# Patient Record
Sex: FEMALE | Race: AMERICAN INDIAN OR ALASKA NATIVE
[De-identification: names, ages, dates, MRNs, and addresses within clinical notes are randomized per-mention and may not be internally consistent; named-entity substitution may affect disease eponyms.]

---

## 2018-08-29 ENCOUNTER — HOSPITAL ENCOUNTER (EMERGENCY)
Dept: HOSPITAL 5 - ED | Age: 43
LOS: 1 days | Discharge: HOME | End: 2018-08-30
Payer: SELF-PAY

## 2018-08-29 DIAGNOSIS — R10.84: Primary | ICD-10-CM

## 2018-08-29 PROCEDURE — 99284 EMERGENCY DEPT VISIT MOD MDM: CPT

## 2018-08-29 PROCEDURE — 84703 CHORIONIC GONADOTROPIN ASSAY: CPT

## 2018-08-29 PROCEDURE — 96375 TX/PRO/DX INJ NEW DRUG ADDON: CPT

## 2018-08-29 PROCEDURE — 82150 ASSAY OF AMYLASE: CPT

## 2018-08-29 PROCEDURE — 74177 CT ABD & PELVIS W/CONTRAST: CPT

## 2018-08-29 PROCEDURE — 83690 ASSAY OF LIPASE: CPT

## 2018-08-29 PROCEDURE — 96374 THER/PROPH/DIAG INJ IV PUSH: CPT

## 2018-08-29 PROCEDURE — 85025 COMPLETE CBC W/AUTO DIFF WBC: CPT

## 2018-08-29 PROCEDURE — 36415 COLL VENOUS BLD VENIPUNCTURE: CPT

## 2018-08-29 PROCEDURE — 80053 COMPREHEN METABOLIC PANEL: CPT

## 2018-08-30 VITALS — DIASTOLIC BLOOD PRESSURE: 82 MMHG | SYSTOLIC BLOOD PRESSURE: 160 MMHG

## 2018-08-30 LAB
ALBUMIN SERPL-MCNC: 3.6 G/DL (ref 3.9–5)
ALT SERPL-CCNC: 9 UNITS/L (ref 7–56)
BASOPHILS # (AUTO): 0 K/MM3 (ref 0–0.1)
BASOPHILS NFR BLD AUTO: 0.3 % (ref 0–1.8)
BUN SERPL-MCNC: 12 MG/DL (ref 7–17)
BUN/CREAT SERPL: 20 %
CALCIUM SERPL-MCNC: 9.4 MG/DL (ref 8.4–10.2)
EOSINOPHIL # BLD AUTO: 0.2 K/MM3 (ref 0–0.4)
EOSINOPHIL NFR BLD AUTO: 1.6 % (ref 0–4.3)
HCT VFR BLD CALC: 28.1 % (ref 30.3–42.9)
HEMOLYSIS INDEX: 0
HGB BLD-MCNC: 8.6 GM/DL (ref 10.1–14.3)
LIPASE SERPL-CCNC: 15 UNITS/L (ref 13–60)
LYMPHOCYTES # BLD AUTO: 2.8 K/MM3 (ref 1.2–5.4)
LYMPHOCYTES NFR BLD AUTO: 27.8 % (ref 13.4–35)
MCH RBC QN AUTO: 20 PG (ref 28–32)
MCHC RBC AUTO-ENTMCNC: 31 % (ref 30–34)
MCV RBC AUTO: 64 FL (ref 79–97)
MONOCYTES # (AUTO): 0.5 K/MM3 (ref 0–0.8)
MONOCYTES % (AUTO): 5 % (ref 0–7.3)
PLATELET # BLD: 528 K/MM3 (ref 140–440)
RBC # BLD AUTO: 4.42 M/MM3 (ref 3.65–5.03)

## 2018-08-30 NOTE — EMERGENCY DEPARTMENT REPORT
HPI





- General


Chief Complaint: Abdominal Pain


Time Seen by Provider: 18 00:44





- HPI


HPI: 





Room 8





The patient is a 43-year-old female presenting with a chief complaint of 

abdominal pain.  The patient states she is constant left flank pain since May 

2018.  The patient states she went to a clinic 2018 labs performed.  The 

patient was given a referral for outpatient CT and GI follow-up however the 

patient has been unable to do so secondary to lack of insurance.  The patient 

states her pain has become diffuse over the past 4-5 days but still greatest on 

the left side.  Patient denies nausea vomiting or diarrhea, dysuria, hematuria 

either.  Patient describes pain as a pressure in nature.  The patient gives her 

pain a score of 7/10.





Location: [See above]


Duration: [See above]


Quality: Pressure


Severity: 7/10


Modifying factors: [see above]


Context: [see above]


Mode of transportation: [not driving]





ED Past Medical Hx





- Past Medical History


Previous Medical History?: Yes


Additional medical history: Anemia





- Surgical History


Past Surgical History?: No





- Family History


Family history: no significant





- Social History


Smoking Status: Never Smoker


Substance Use Type: None (denies illicit drug use)





- Medications


Home Medications: 


 Home Medications











 Medication  Instructions  Recorded  Confirmed  Last Taken  Type


 


traMADol [Ultram] 50 mg PO Q6HR PRN #20 tablet 18  Unknown Rx














ED Review of Systems


ROS: 


Stated complaint: STOMACH PAIN


Other details as noted in HPI





Constitutional: denies: fever


Eyes: denies: eye pain


ENT: denies: throat pain


Respiratory: no symptoms reported


Cardiovascular: denies: chest pain


Endocrine: no symptoms reported


Gastrointestinal: abdominal pain.  denies: nausea, vomiting, diarrhea


Genitourinary: denies: dysuria, hematuria


Musculoskeletal: denies: back pain


Neurological: denies: headache





Physical Exam





- Physical Exam


Vital Signs: 


 Vital Signs











  18





  20:38 00:27


 


Temperature 99 F 97.6 F


 


Pulse Rate 90 85


 


Respiratory 16 14





Rate  


 


Blood Pressure 167/68 207/65


 


O2 Sat by Pulse 97 100





Oximetry  











Physical Exam: 





GENERAL: The patient is well-developed well-nourished female lying on stretcher 

not appearing to be in acute distress. []


HEENT: Normocephalic.  Atraumatic.  Extraocular motions are intact.  Patient 

has moist mucous membranes.


NECK: Supple.  Trachea midline


CHEST/LUNGS: Clear to auscultation.  There is no respiratory distress noted.


HEART/CARDIOVASCULAR: Regular.  There is no tachycardia.  There is no gallop 

rub or murmur.


ABDOMEN: Abdomen is soft, diffusely tender to palpation but greatest in the 

epigastric region.  There is no rebound or guarding.  Patient has normal bowel 

sounds.  There is no abdominal distention.


SKIN: There is no rash.  There is no edema.  There is no diaphoresis.


NEURO: The patient is awake, alert, and oriented.  The patient is cooperative.  

The patient has normal speech


MUSCULOSKELETAL: There is no CVA tenderness.  There is no evidence of acute 

injury.





ED Course


 Vital Signs











  18





  20:38 00:27


 


Temperature 99 F 97.6 F


 


Pulse Rate 90 85


 


Respiratory 16 14





Rate  


 


Blood Pressure 167/68 207/65


 


O2 Sat by Pulse 97 100





Oximetry  














ED Medical Decision Making





- Lab Data


Result diagrams: 


 18 00:58





 18 00:58





 Laboratory Tests











  18





  00:58 00:58 00:58


 


WBC  10.2  


 


RBC  4.42  


 


Hgb  8.6 L  


 


Hct  28.1 L  


 


MCV  64 L  


 


MCH  20 L  


 


MCHC  31  


 


RDW  23.4 H  


 


Plt Count  528 H  


 


Lymph % (Auto)  27.8  


 


Mono % (Auto)  5.0  


 


Eos % (Auto)  1.6  


 


Baso % (Auto)  0.3  


 


Lymph #  2.8  


 


Mono #  0.5  


 


Eos #  0.2  


 


Baso #  0.0  


 


Seg Neutrophils %  65.3  


 


Seg Neutrophils #  6.7  


 


Sodium   138 


 


Potassium   4.0 


 


Chloride   100.1 


 


Carbon Dioxide   26 


 


Anion Gap   16 


 


BUN   12 


 


Creatinine   0.6 L 


 


Estimated GFR   > 60 


 


BUN/Creatinine Ratio   20 


 


Glucose   103 H 


 


Calcium   9.4 


 


Total Bilirubin   0.20 


 


AST   7 


 


ALT   9 


 


Alkaline Phosphatase   105 


 


Total Protein   7.0 


 


Albumin   3.6 L 


 


Albumin/Globulin Ratio   1.1 


 


Amylase   28 


 


Lipase   15 


 


HCG, Qual    Negative














- Radiology Data


Radiology results: report reviewed (CT abdomen and pelvis), image reviewed (CT 

abdomen and pelvis)





[facility] 


[address] [place] [address] 





Cat Scan Report 


Signed 





Patient: GIANNA CAPELLAN MR#: [id number] 


: 1975 Acct:[id number] 


Age/Sex: 43 / F ADM Date: 18 


Loc: ED 


Attending Dr: 








Ordering Physician: KATHYA CELIS MD 


Date of Service: 18 


Procedure(s): CT abdomen pelvis w con 


Accession Number(s): [id number] 





cc: KATHYA CELIS MD 








FINAL REPORT 





EXAM: CT ABDOMEN PELVIS W CON 





HISTORY: diffuse abdominal pain greatest in the epigastric 





COMPARISON: None available. 





TECHNIQUE: Contiguous axial images were obtained. Additional 


sagittal and coronal reformatted images were obtained. 


Administration of IV contrast given per institution protocol. 


Images submitted for interpretation. 100 cc Omnipaque 350. 





FINDINGS: 


Mild linear atelectasis at the right lung base. No calcified 


gallstones or biliary dilatation. Homogeneous enhancement of the 


liver, spleen, pancreas and adrenal glands. No peripancreatic 


stranding or fluid. 





No solid renal lesion. No hydronephrosis. Aorta and IVC normal in 


caliber. Urinary bladder is unremarkable. IUD is low lying along 


endocervical canal and lower uterine segment region of the 


uterus. Ovaries are grossly unremarkable. No free fluid or 


lymphadenopathy. 





Stomach is partially decompressed. No adjacent fat stranding or 


fluid. No focal asymmetric wall thickening of the stomach. 





The appendix is normal in caliber measuring 5 millimeters in 


diameter. Large and small bowel loops normal in caliber. No focal 


inflammatory changes the bowel. Tiny umbilical hernia containing 


fat only. 





Mild degenerative changes of the lumbar spine. Bony pelvis is 


grossly intact. 





IMPRESSION: 


No focal inflammatory changes of the abdomen and pelvis. 





IUD is low lying centered along the endocervical canal and lower 


uterus segment region of the uterus. Uterus and ovaries are 


otherwise unremarkable. 











Transcribed By: LMA 


Dictated By: SHARDA WAGONER MD 


Electronically Authenticated By: SHARDA WAGONER MD 


Signed Date/Time: 18 











DD/DT: 18 


TD/TT: 18





- Differential Diagnosis


gastritis, Crohn's disease, renal colic, diverticulitis


Critical care attestation.: 


If time is entered above; I have spent that time in minutes in the direct care 

of this critically ill patient, excluding procedure time.








ED Disposition


Clinical Impression: 


 Abdominal pain





Disposition: DC-01 TO HOME OR SELFCARE


Is pt being admited?: No


Does the pt Need Aspirin: No


Condition: Stable


Instructions:  Abdominal Pain (ED)


Additional Instructions: 


Return to the emergency department immediately should you develop worsening 

symptoms, fever, inability to tolerate food or liquid or any other concerns.


Prescriptions: 


traMADol [Ultram] 50 mg PO Q6HR PRN #20 tablet


 PRN Reason: Pain


Referrals: 


PRIMARY CARE,MD [Primary Care Provider] - 3-5 Days


[facility] [Outside] - 3-5 Days


ROSEANNE WANG MD [Staff Physician] - 3-5 Days (Dr. Wang is a 

gastroenterologist.  Please follow with him for further evaluation)


Time of Disposition: 02:53

## 2018-08-30 NOTE — CAT SCAN REPORT
FINAL REPORT



EXAM:  CT ABDOMEN PELVIS W CON



HISTORY:  diffuse abdominal pain greatest in the epigastric 



COMPARISON:  None available. 



TECHNIQUE:  Contiguous axial images were obtained. Additional

sagittal and coronal reformatted images were obtained.

Administration of IV contrast given per institution protocol.

Images submitted for interpretation. 100 cc Omnipaque 350. 



FINDINGS:  

Mild linear atelectasis at the right lung base. No calcified

gallstones or biliary dilatation. Homogeneous enhancement of the

liver, spleen, pancreas and adrenal glands. No peripancreatic

stranding or fluid. 



No solid renal lesion. No hydronephrosis. Aorta and IVC normal in

caliber. Urinary bladder is unremarkable. IUD is low lying along

endocervical canal and lower uterine segment region of the

uterus. Ovaries are grossly unremarkable. No free fluid or

lymphadenopathy. 



Stomach is partially decompressed. No adjacent fat stranding or

fluid. No focal asymmetric wall thickening of the stomach. 



The appendix is normal in caliber measuring 5 millimeters in

diameter. Large and small bowel loops normal in caliber. No focal

inflammatory changes the bowel. Tiny umbilical hernia containing

fat only. 



Mild degenerative changes of the lumbar spine. Bony pelvis is

grossly intact. 



IMPRESSION:  

No focal inflammatory changes of the abdomen and pelvis. 



IUD is low lying centered along the endocervical canal and lower

uterus segment region of the uterus. Uterus and ovaries are

otherwise unremarkable.

## 2021-07-15 ENCOUNTER — WEB ENCOUNTER (OUTPATIENT)
Dept: URBAN - METROPOLITAN AREA CLINIC 23 | Facility: CLINIC | Age: 46
End: 2021-07-15

## 2021-08-24 ENCOUNTER — DASHBOARD ENCOUNTERS (OUTPATIENT)
Age: 46
End: 2021-08-24

## 2021-08-24 ENCOUNTER — OFFICE VISIT (OUTPATIENT)
Dept: URBAN - METROPOLITAN AREA CLINIC 12 | Facility: CLINIC | Age: 46
End: 2021-08-24

## 2021-08-24 ENCOUNTER — OFFICE VISIT (OUTPATIENT)
Dept: URBAN - METROPOLITAN AREA CLINIC 12 | Facility: CLINIC | Age: 46
End: 2021-08-24
Payer: COMMERCIAL

## 2021-08-24 VITALS
DIASTOLIC BLOOD PRESSURE: 90 MMHG | HEART RATE: 73 BPM | HEIGHT: 65 IN | WEIGHT: 293 LBS | SYSTOLIC BLOOD PRESSURE: 170 MMHG | BODY MASS INDEX: 48.82 KG/M2 | TEMPERATURE: 96.8 F

## 2021-08-24 DIAGNOSIS — D50.9 IRON DEFICIENCY ANEMIA: ICD-10-CM

## 2021-08-24 PROCEDURE — 99244 OFF/OP CNSLTJ NEW/EST MOD 40: CPT | Performed by: INTERNAL MEDICINE

## 2021-08-24 RX ORDER — HYDROCHLOROTHIAZIDE 12.5 MG/1
TABLET ORAL
Qty: 0 | Refills: 0 | COMMUNITY
Start: 1900-01-01

## 2021-08-24 RX ORDER — CHLORHEXIDINE GLUCONATE 4 %
LIQUID (ML) TOPICAL
Qty: 0 | Refills: 0 | Status: ACTIVE | COMMUNITY
Start: 1900-01-01

## 2021-08-24 RX ORDER — HYDROCHLOROTHIAZIDE 12.5 MG/1
TABLET ORAL
Qty: 0 | Refills: 0 | Status: ACTIVE | COMMUNITY
Start: 1900-01-01

## 2021-08-24 RX ORDER — AMLODIPINE BESYLATE 10 MG/1
TAKE 1 TABLET (10 MG) BY ORAL ROUTE ONCE DAILY TABLET ORAL 1
Qty: 0 | Refills: 0 | COMMUNITY
Start: 1900-01-01

## 2021-08-24 RX ORDER — AMLODIPINE BESYLATE 10 MG/1
TAKE 1 TABLET (10 MG) BY ORAL ROUTE ONCE DAILY TABLET ORAL 1
Qty: 0 | Refills: 0 | Status: ACTIVE | COMMUNITY
Start: 1900-01-01

## 2021-08-24 RX ORDER — CHLORHEXIDINE GLUCONATE 4 %
LIQUID (ML) TOPICAL
Qty: 0 | Refills: 0 | COMMUNITY
Start: 1900-01-01

## 2021-08-24 NOTE — HPI-TODAY'S VISIT:
46F ref by Dr Elinor Garvey for GI eval for anemia and hematochezia.  A copy of this document will be sent to the referring physician. . EGD 2019--for anemia--normal with normal bx Colon2019--for anemia--tortuous. cecum could not be intubated.  CT colon 2019--no aqbnormality seen  . she says she had hysterectomy. the anemia resolved after that. however she did see blood on wiping once 2 months ago. she also says that bld work in july showed anemia. hence she was sent her to r/o GI bleed as the cause for anemia

## 2021-09-24 ENCOUNTER — TELEPHONE ENCOUNTER (OUTPATIENT)
Dept: URBAN - METROPOLITAN AREA CLINIC 92 | Facility: CLINIC | Age: 46
End: 2021-09-24

## 2021-09-24 ENCOUNTER — OFFICE VISIT (OUTPATIENT)
Dept: URBAN - METROPOLITAN AREA MEDICAL CENTER 28 | Facility: MEDICAL CENTER | Age: 46
End: 2021-09-24
Payer: COMMERCIAL

## 2021-09-24 DIAGNOSIS — K31.89 ACQUIRED DEFORMITY OF DUODENUM: ICD-10-CM

## 2021-09-24 DIAGNOSIS — D50.9 ANEMIA: ICD-10-CM

## 2021-09-24 DIAGNOSIS — K62.89 ACUTE PROCTITIS: ICD-10-CM

## 2021-09-24 PROCEDURE — 45380 COLONOSCOPY AND BIOPSY: CPT | Performed by: INTERNAL MEDICINE

## 2021-09-24 PROCEDURE — 43239 EGD BIOPSY SINGLE/MULTIPLE: CPT | Performed by: INTERNAL MEDICINE

## 2021-09-24 RX ORDER — AMLODIPINE BESYLATE 10 MG/1
TAKE 1 TABLET (10 MG) BY ORAL ROUTE ONCE DAILY TABLET ORAL 1
Qty: 0 | Refills: 0 | COMMUNITY
Start: 1900-01-01

## 2021-09-24 RX ORDER — CHLORHEXIDINE GLUCONATE 4 %
LIQUID (ML) TOPICAL
Qty: 0 | Refills: 0 | COMMUNITY
Start: 1900-01-01

## 2021-09-24 RX ORDER — HYDROCHLOROTHIAZIDE 12.5 MG/1
TABLET ORAL
Qty: 0 | Refills: 0 | COMMUNITY
Start: 1900-01-01

## 2021-10-01 ENCOUNTER — TELEPHONE ENCOUNTER (OUTPATIENT)
Dept: URBAN - METROPOLITAN AREA CLINIC 92 | Facility: CLINIC | Age: 46
End: 2021-10-01

## 2021-10-01 ENCOUNTER — LAB OUTSIDE AN ENCOUNTER (OUTPATIENT)
Dept: URBAN - METROPOLITAN AREA CLINIC 92 | Facility: CLINIC | Age: 46
End: 2021-10-01

## 2021-10-01 PROBLEM — 87522002 IRON DEFICIENCY ANEMIA: Status: ACTIVE | Noted: 2021-08-24

## 2025-06-12 ENCOUNTER — OFFICE VISIT (OUTPATIENT)
Dept: URBAN - METROPOLITAN AREA CLINIC 12 | Facility: CLINIC | Age: 50
End: 2025-06-12
Payer: COMMERCIAL

## 2025-06-12 DIAGNOSIS — K92.1 HEMATOCHEZIA: ICD-10-CM

## 2025-06-12 DIAGNOSIS — R10.30 LOWER ABDOMINAL PAIN: ICD-10-CM

## 2025-06-12 DIAGNOSIS — R10.13 EPIGASTRIC ABDOMINAL PAIN: ICD-10-CM

## 2025-06-12 PROCEDURE — 99204 OFFICE O/P NEW MOD 45 MIN: CPT | Performed by: INTERNAL MEDICINE

## 2025-06-12 RX ORDER — CHLORHEXIDINE GLUCONATE 4 %
LIQUID (ML) TOPICAL
Qty: 0 | Refills: 0 | Status: ON HOLD | COMMUNITY
Start: 1900-01-01

## 2025-06-12 RX ORDER — HYDROCHLOROTHIAZIDE 12.5 MG/1
TABLET ORAL
Qty: 0 | Refills: 0 | Status: ON HOLD | COMMUNITY
Start: 1900-01-01

## 2025-06-12 RX ORDER — LORAZEPAM 1 MG/1
1 TABLET AT BEDTIME AS NEEDED TABLET ORAL ONCE A DAY
Status: ACTIVE | COMMUNITY

## 2025-06-12 RX ORDER — METHOCARBAMOL 500 MG/1
1.5 TABLETS TABLET ORAL
Status: ACTIVE | COMMUNITY

## 2025-06-12 RX ORDER — LOSARTAN POTASSIUM 100 MG/1
1 TABLET TABLET, FILM COATED ORAL ONCE A DAY
Status: ACTIVE | COMMUNITY

## 2025-06-12 RX ORDER — AMLODIPINE BESYLATE 10 MG/1
TAKE 1 TABLET (10 MG) BY ORAL ROUTE ONCE DAILY TABLET ORAL 1
Qty: 0 | Refills: 0 | Status: ON HOLD | COMMUNITY
Start: 1900-01-01

## 2025-06-12 RX ORDER — CARVEDILOL 25 MG/1
1 TABLET WITH FOOD TABLET, FILM COATED ORAL TWICE A DAY
Status: ACTIVE | COMMUNITY

## 2025-06-12 RX ORDER — OLANZAPINE 5 MG/1
1 TABLET TABLET ORAL ONCE A DAY
Status: ACTIVE | COMMUNITY

## 2025-06-12 RX ORDER — SPIRONOLACTONE 25 MG/1
1 TABLET TABLET, FILM COATED ORAL TWICE A DAY
Status: ACTIVE | COMMUNITY

## 2025-06-12 RX ORDER — OMEPRAZOLE 40 MG/1
1 CAPSULE 30 MINUTES BEFORE MORNING MEAL CAPSULE, DELAYED RELEASE ORAL TWICE DAILY
Qty: 60 | Refills: 2 | OUTPATIENT
Start: 2025-06-12

## 2025-06-12 NOTE — HPI-TODAY'S VISIT:
49F here with hematochezia.  s/p partial hysterectomy DM . EGD 2021--for anemia--normal with normal bx Colon2021--for anemia--tortuous. int ext hemorrhoids  CT colon 2019--no aqbnormality seen  . she says she has BRBPR on wiping on and off also c/o epig pain and discomfort. recently lost mom, so she wonders if the grief has something to do with her symptoms not on PPI. Hb was 12.5 in 3/2025

## 2025-06-25 ENCOUNTER — TELEPHONE ENCOUNTER (OUTPATIENT)
Dept: URBAN - METROPOLITAN AREA CLINIC 23 | Facility: CLINIC | Age: 50
End: 2025-06-25

## 2025-08-05 ENCOUNTER — OFFICE VISIT (OUTPATIENT)
Dept: URBAN - METROPOLITAN AREA CLINIC 111 | Facility: CLINIC | Age: 50
End: 2025-08-05

## 2025-08-29 ENCOUNTER — OFFICE VISIT (OUTPATIENT)
Dept: URBAN - METROPOLITAN AREA CLINIC 12 | Facility: CLINIC | Age: 50
End: 2025-08-29